# Patient Record
Sex: FEMALE | Race: WHITE | URBAN - METROPOLITAN AREA
[De-identification: names, ages, dates, MRNs, and addresses within clinical notes are randomized per-mention and may not be internally consistent; named-entity substitution may affect disease eponyms.]

---

## 2017-05-25 ENCOUNTER — HISTORICAL (OUTPATIENT)
Dept: ADMINISTRATIVE | Facility: HOSPITAL | Age: 72
End: 2017-05-25

## 2022-04-30 NOTE — OP NOTE
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    POSTOPERATIVE DIAGNOSIS:  Torn posterior capsule, retained cortex, and vitreous prolapse to the left eye.    PROCEDURE:  Pars plana vitrectomy with pars plana lensectomy and placement of intraocular lens in ciliary sulcus all to the left eye.    ANESTHESIA:  General.    ESTIMATED BLOOD LOSS:  Less than 5 cc.    COMPLICATIONS:  None.    INDICATIONS FOR PROCEDURE:  This patient has a history of recent cataract surgery to the left eye where she moved due to anxiety causing a tear in the posterior capsule and loss of some cortical material into the vitreous cavity.  It was impossible to place an intraocular lens so the cataract surgeon finished the surgery and referred the patient to retina.    PROCEDURE IN DETAIL:  The patient was taken to the operative theater where general anesthesia was begun.  The left eye then prepped and draped in the normal sterile fashion and a lid speculum was applied.  A standard three port, 25-gauge pars plana vitrectomy was performed, with all trocars being placed 3.5 mm from the surgical limbus.  A core vitrectomy was performed which removed the vitreous prolapse in the anterior chamber as well.  Cortical fragments in the inferior vitreus cavity were able to be removed using the vitrectomy cutter.  There was no posterior vitreous detachment but none was created in an effort to prevent retinal tear formation.  The peripheral retina was examined with scleral depression and no retinal breaks were identified.  The anterior capsule was examined and seen to be intact in 360 degrees with a central capsulorrhexis.  There was no posterior capsule.  A superior 3 mm clear corneal wound was created with a slit knife and Healon was injected into the anterior chamber.  A three piece MA60AC lens was placed into the ciliary sulcus and  confirmed to be in the correct position and well centered.  A single 10-0 nylon suture was placed in the clear corneal wound.  The three trocars were removed and their sclerotomies massaged closed with cotton tip swabs.  The postop intraocular pressure was 15 mmHg.  The lid speculum and eye drape were then removed and the eye was covered with a gauze patch and a Majano shield.  The     patient was then transported to the postop care unit to recover.  Discharge condition is good.  Disposition is home with follow up with Dr. Belcher the following day.  Patient tolerated the procedure well.        ______________________________  MD ALEX Vogel/DOLORES  DD:  05/25/2017  Time:  05:05PM  DT:  05/26/2017  Time:  06:59AM  Job #:  [id number]